# Patient Record
Sex: MALE | URBAN - METROPOLITAN AREA
[De-identification: names, ages, dates, MRNs, and addresses within clinical notes are randomized per-mention and may not be internally consistent; named-entity substitution may affect disease eponyms.]

---

## 2020-09-02 ENCOUNTER — APPOINTMENT (RX ONLY)
Dept: URBAN - METROPOLITAN AREA OTHER 13 | Facility: OTHER | Age: 40
Setting detail: DERMATOLOGY
End: 2020-09-02

## 2020-09-02 DIAGNOSIS — B35.3 TINEA PEDIS: ICD-10-CM

## 2020-09-02 DIAGNOSIS — B35.6 TINEA CRURIS: ICD-10-CM | Status: INADEQUATELY CONTROLLED

## 2020-09-02 DIAGNOSIS — Z71.89 OTHER SPECIFIED COUNSELING: ICD-10-CM

## 2020-09-02 DIAGNOSIS — B35.4 TINEA CORPORIS: ICD-10-CM | Status: INADEQUATELY CONTROLLED

## 2020-09-02 PROCEDURE — 99203 OFFICE O/P NEW LOW 30 MIN: CPT

## 2020-09-02 PROCEDURE — ? TREATMENT REGIMEN

## 2020-09-02 PROCEDURE — ? SUNSCREEN RECOMMENDATIONS

## 2020-09-02 PROCEDURE — ? COUNSELING

## 2020-09-02 PROCEDURE — 87220 TISSUE EXAM FOR FUNGI: CPT

## 2020-09-02 PROCEDURE — ? KOH PREP

## 2020-09-02 PROCEDURE — ? PRESCRIPTION

## 2020-09-02 RX ORDER — ECONAZOLE NITRATE 10 MG/G
CREAM TOPICAL BID
Qty: 2 | Refills: 3 | Status: ERX | COMMUNITY
Start: 2020-09-02

## 2020-09-02 RX ORDER — TERBINAFINE HYDROCHLORIDE 250 MG/1
TABLET ORAL
Qty: 14 | Refills: 0 | Status: ERX | COMMUNITY
Start: 2020-09-02

## 2020-09-02 RX ADMIN — ECONAZOLE NITRATE: 10 CREAM TOPICAL at 00:00

## 2020-09-02 RX ADMIN — TERBINAFINE HYDROCHLORIDE: 250 TABLET ORAL at 00:00

## 2020-09-02 ASSESSMENT — LOCATION DETAILED DESCRIPTION DERM
LOCATION DETAILED: LEFT DISTAL PRETIBIAL REGION
LOCATION DETAILED: LEFT MEDIAL UPPER BACK
LOCATION DETAILED: RIGHT ANKLE
LOCATION DETAILED: RIGHT ANTERIOR PROXIMAL THIGH
LOCATION DETAILED: SUPERIOR LUMBAR SPINE
LOCATION DETAILED: LEFT INFERIOR MEDIAL UPPER BACK
LOCATION DETAILED: RIGHT SUPERIOR UPPER BACK
LOCATION DETAILED: LEFT ANTERIOR PROXIMAL THIGH

## 2020-09-02 ASSESSMENT — LOCATION SIMPLE DESCRIPTION DERM
LOCATION SIMPLE: RIGHT ANKLE
LOCATION SIMPLE: LEFT UPPER BACK
LOCATION SIMPLE: LOWER BACK
LOCATION SIMPLE: LEFT PRETIBIAL REGION
LOCATION SIMPLE: RIGHT THIGH
LOCATION SIMPLE: LEFT THIGH
LOCATION SIMPLE: RIGHT UPPER BACK

## 2020-09-02 ASSESSMENT — LOCATION ZONE DERM
LOCATION ZONE: LEG
LOCATION ZONE: TRUNK

## 2020-09-02 ASSESSMENT — SEVERITY ASSESSMENT: SEVERITY: MODERATE TO SEVERE

## 2020-09-02 NOTE — PROCEDURE: KOH PREP
Body Location Override (Optional - Billing Will Still Be Based On Selected Body Map Location If Applicable): back
Detail Level: Zone
Showing: branching hyphae
Koh Intro Text (From The.....): A KOH prep was ordered and evaluated from the
Koh Procedure Text (Tissue Harvesting Technique): A 15-blade scalpel was used to scrape the skin. The skin scrapings were placed on a glass slide, covered with a coverslip and a KOH solution was applied.

## 2020-09-02 NOTE — HPI: RASH
How Severe Is Your Rash?: severe
Is This A New Presentation, Or A Follow-Up?: Rash
Additional History: He was prescribed TMC Cream 0.1% , an IM injection last Friday (which cleared it the next day) Bactrum DS X 10 days from [facility] . He had to leave work today due to the severity of the pain.

## 2020-09-02 NOTE — PROCEDURE: TREATMENT REGIMEN
Initiate Treatment: Start Terbenafine 250mg qd x 2 weeks\\nApply Econazole cream bid to ALL affected areas
Detail Level: Zone
Plan: Encouraged him to change clothes at work more than once due to working in a moist environment
Otc Regimen: Ok to try Zeasorb AF powder to his feet prior to wearing socks